# Patient Record
Sex: FEMALE | Race: BLACK OR AFRICAN AMERICAN | NOT HISPANIC OR LATINO | Employment: OTHER | URBAN - METROPOLITAN AREA
[De-identification: names, ages, dates, MRNs, and addresses within clinical notes are randomized per-mention and may not be internally consistent; named-entity substitution may affect disease eponyms.]

---

## 2018-07-02 ENCOUNTER — HOSPITAL ENCOUNTER (EMERGENCY)
Facility: HOSPITAL | Age: 68
Discharge: HOME | End: 2018-07-03
Attending: EMERGENCY MEDICINE
Payer: OTHER GOVERNMENT

## 2018-07-02 DIAGNOSIS — R33.9 URINARY RETENTION: ICD-10-CM

## 2018-07-02 DIAGNOSIS — R41.82 ALTERED MENTAL STATUS: ICD-10-CM

## 2018-07-02 DIAGNOSIS — K59.00 CONSTIPATION, UNSPECIFIED CONSTIPATION TYPE: Primary | ICD-10-CM

## 2018-07-02 LAB
ALBUMIN SERPL BCP-MCNC: 3 G/DL
ALP SERPL-CCNC: 113 U/L
ALT SERPL W/O P-5'-P-CCNC: 41 U/L
ANION GAP SERPL CALC-SCNC: 9 MMOL/L
AST SERPL-CCNC: 73 U/L
BASOPHILS # BLD AUTO: 0.02 K/UL
BASOPHILS NFR BLD: 0.4 %
BILIRUB SERPL-MCNC: 1.3 MG/DL
BILIRUB UR QL STRIP: NEGATIVE
BUN SERPL-MCNC: 17 MG/DL
CALCIUM SERPL-MCNC: 10.2 MG/DL
CHLORIDE SERPL-SCNC: 110 MMOL/L
CLARITY UR: CLEAR
CO2 SERPL-SCNC: 24 MMOL/L
COLOR UR: YELLOW
CREAT SERPL-MCNC: 1.3 MG/DL
DIFFERENTIAL METHOD: ABNORMAL
EOSINOPHIL # BLD AUTO: 0.2 K/UL
EOSINOPHIL NFR BLD: 3.9 %
ERYTHROCYTE [DISTWIDTH] IN BLOOD BY AUTOMATED COUNT: 17.3 %
EST. GFR  (AFRICAN AMERICAN): 49 ML/MIN/1.73 M^2
EST. GFR  (NON AFRICAN AMERICAN): 43 ML/MIN/1.73 M^2
GLUCOSE SERPL-MCNC: 138 MG/DL
GLUCOSE UR QL STRIP: NEGATIVE
HCT VFR BLD AUTO: 32.6 %
HGB BLD-MCNC: 11 G/DL
HGB UR QL STRIP: NEGATIVE
KETONES UR QL STRIP: NEGATIVE
LEUKOCYTE ESTERASE UR QL STRIP: NEGATIVE
LYMPHOCYTES # BLD AUTO: 1.2 K/UL
LYMPHOCYTES NFR BLD: 26.1 %
MAGNESIUM SERPL-MCNC: 2.1 MG/DL
MCH RBC QN AUTO: 30.7 PG
MCHC RBC AUTO-ENTMCNC: 33.7 G/DL
MCV RBC AUTO: 91 FL
MONOCYTES # BLD AUTO: 0.9 K/UL
MONOCYTES NFR BLD: 20 %
NEUTROPHILS # BLD AUTO: 2.3 K/UL
NEUTROPHILS NFR BLD: 49.2 %
NITRITE UR QL STRIP: NEGATIVE
PH UR STRIP: 5 [PH] (ref 5–8)
PLATELET # BLD AUTO: 134 K/UL
PMV BLD AUTO: 10.9 FL
POCT GLUCOSE: 161 MG/DL (ref 70–110)
POTASSIUM SERPL-SCNC: 4.3 MMOL/L
PROT SERPL-MCNC: 8.6 G/DL
PROT UR QL STRIP: NEGATIVE
RBC # BLD AUTO: 3.58 M/UL
SODIUM SERPL-SCNC: 143 MMOL/L
SP GR UR STRIP: 1.01 (ref 1–1.03)
TROPONIN I SERPL DL<=0.01 NG/ML-MCNC: 0.01 NG/ML
URN SPEC COLLECT METH UR: ABNORMAL
UROBILINOGEN UR STRIP-ACNC: ABNORMAL EU/DL
WBC # BLD AUTO: 4.64 K/UL

## 2018-07-02 PROCEDURE — 84484 ASSAY OF TROPONIN QUANT: CPT

## 2018-07-02 PROCEDURE — 87086 URINE CULTURE/COLONY COUNT: CPT

## 2018-07-02 PROCEDURE — 82962 GLUCOSE BLOOD TEST: CPT

## 2018-07-02 PROCEDURE — 99285 EMERGENCY DEPT VISIT HI MDM: CPT | Mod: 25

## 2018-07-02 PROCEDURE — 25000003 PHARM REV CODE 250: Performed by: EMERGENCY MEDICINE

## 2018-07-02 PROCEDURE — 93010 ELECTROCARDIOGRAM REPORT: CPT | Mod: ,,, | Performed by: INTERNAL MEDICINE

## 2018-07-02 PROCEDURE — 85025 COMPLETE CBC W/AUTO DIFF WBC: CPT

## 2018-07-02 PROCEDURE — 81003 URINALYSIS AUTO W/O SCOPE: CPT

## 2018-07-02 PROCEDURE — 80053 COMPREHEN METABOLIC PANEL: CPT

## 2018-07-02 PROCEDURE — 83735 ASSAY OF MAGNESIUM: CPT

## 2018-07-02 PROCEDURE — 93005 ELECTROCARDIOGRAM TRACING: CPT

## 2018-07-02 RX ORDER — CHOLECALCIFEROL (VITAMIN D3) 25 MCG
1000 TABLET ORAL DAILY
COMMUNITY

## 2018-07-02 RX ORDER — AMLODIPINE BESYLATE 5 MG/1
5 TABLET ORAL DAILY
COMMUNITY

## 2018-07-02 RX ORDER — LISINOPRIL 10 MG/1
10 TABLET ORAL DAILY
COMMUNITY

## 2018-07-02 RX ORDER — ARIPIPRAZOLE 10 MG/1
5 TABLET ORAL DAILY
COMMUNITY

## 2018-07-02 RX ORDER — DOCUSATE SODIUM 100 MG/1
100 CAPSULE, LIQUID FILLED ORAL 2 TIMES DAILY
COMMUNITY

## 2018-07-02 RX ORDER — FUROSEMIDE 40 MG/1
40 TABLET ORAL 2 TIMES DAILY
COMMUNITY

## 2018-07-02 RX ORDER — PSEUDOEPHEDRINE/ACETAMINOPHEN 30MG-500MG
100 TABLET ORAL
Status: COMPLETED | OUTPATIENT
Start: 2018-07-02 | End: 2018-07-02

## 2018-07-02 RX ORDER — FERROUS SULFATE 325(65) MG
325 TABLET, DELAYED RELEASE (ENTERIC COATED) ORAL
COMMUNITY

## 2018-07-02 RX ORDER — SYRING-NEEDL,DISP,INSUL,0.3 ML 29 G X1/2"
296 SYRINGE, EMPTY DISPOSABLE MISCELLANEOUS
Status: COMPLETED | OUTPATIENT
Start: 2018-07-02 | End: 2018-07-02

## 2018-07-02 RX ORDER — OMEPRAZOLE 20 MG/1
20 CAPSULE, DELAYED RELEASE ORAL DAILY
COMMUNITY

## 2018-07-02 RX ORDER — PROPRANOLOL HYDROCHLORIDE 20 MG/1
20 TABLET ORAL 3 TIMES DAILY
COMMUNITY

## 2018-07-02 RX ORDER — POTASSIUM CHLORIDE 20 MEQ/1
20 TABLET, EXTENDED RELEASE ORAL 2 TIMES DAILY
COMMUNITY

## 2018-07-02 RX ORDER — POLYVINYL ALCOHOL 14 MG/ML
1 SOLUTION/ DROPS OPHTHALMIC
COMMUNITY

## 2018-07-02 RX ORDER — OXYMETAZOLINE HCL 0.05 %
2 SPRAY, NON-AEROSOL (ML) NASAL 2 TIMES DAILY
COMMUNITY

## 2018-07-02 RX ADMIN — MAGESIUM CITRATE 296 ML: 1.75 LIQUID ORAL at 10:07

## 2018-07-02 RX ADMIN — Medication 100 ML: at 10:07

## 2018-07-02 RX ADMIN — SODIUM CHLORIDE 500 ML: 0.9 INJECTION, SOLUTION INTRAVENOUS at 10:07

## 2018-07-02 NOTE — ED PROVIDER NOTES
Encounter Date: 7/2/2018    SCRIBE #1 NOTE: I, Bo Toney, am scribing for, and in the presence of,  Ramona Whittaker MD. I have scribed the following portions of the note - Other sections scribed: HPI, ROS, PE.       History     Chief Complaint   Patient presents with    Altered Mental Status     Sent from [facility]. Was placed on psych meds and started to have a decline in mental status. Reports decreasing meds and pt still lethargic and  having difficulty feeding herself. [facility] reports pts hands have become contracted since last week and she has a UTI. Upon arrival pt has no complaints but has slurred speech     CC: Abdominal Pain; Knee Pain    HPI: This 68 y.o female with a PMHx DM, HTN, Seizures, schizophrenia and PSHx Knee surgery presents to the ED via EMS [facility] for evaluation of decrease in her mental status.  The patient was recently started on benztropine and Abilify.  And staff noted that she was lethargic and having difficulty feeding herself.  The staff reports that her medications were decrease but they still noticed that patient is having difficulty.  At time history and physical patient is resting comfortably in stretcher.  She complains of abdominal discomfort and knee pain. Denies fever chills, chest pain, change in her vision, numbness or weakness.      The history is provided by the patient. The history is limited by the condition of the patient. No  was used.     Review of patient's allergies indicates:   Allergen Reactions    Keflex [cephalexin]      Past Medical History:   Diagnosis Date    Arthritis     Diabetes mellitus     Hypertension     Seizures     Sinusitis      Past Surgical History:   Procedure Laterality Date    BONY PELVIS SURGERY      BREAST SURGERY      HYSTERECTOMY      KNEE SURGERY       History reviewed. No pertinent family history.  Social History   Substance Use Topics    Smoking status: Never Smoker     Smokeless tobacco: Never Used    Alcohol use No     Review of Systems   Constitutional: Negative for chills and fever.   Eyes: Negative for visual disturbance.   Respiratory: Positive for shortness of breath.    Cardiovascular: Negative for chest pain.   Gastrointestinal: Positive for abdominal pain (Pt points to LLQ).   Genitourinary: Negative for dysuria.   Musculoskeletal: Positive for arthralgias. Negative for back pain.        (+) knee pain     Neurological: Negative for numbness.   Psychiatric/Behavioral: Negative for suicidal ideas.       Physical Exam     Initial Vitals [07/02/18 1704]   BP Pulse Resp Temp SpO2   109/76 68 20 98.6 °F (37 °C) 99 %      MAP       --         Physical Exam    Nursing note and vitals reviewed.  Constitutional: She is not diaphoretic.  Non-toxic appearance. She does not appear ill. No distress.   HENT:   Head: Normocephalic and atraumatic.   Mouth/Throat: Oropharynx is clear and moist.   Eyes: EOM are normal. Pupils are equal, round, and reactive to light. No scleral icterus.   Neck: Normal range of motion. Neck supple. No JVD present.   Cardiovascular: Normal rate and regular rhythm.   Pulmonary/Chest: Effort normal and breath sounds normal. No respiratory distress.   Abdominal: Soft. Normal appearance. She exhibits distension (mild). There is no tenderness. There is no rebound and no guarding.   Musculoskeletal: She exhibits no tenderness.   Bilateral knees without significant effusion, no erythema no warmth to touch.   Neurological: She is alert. She has normal strength. No cranial nerve deficit or sensory deficit.   Drowsy   Skin: Skin is warm and dry.   Psychiatric:   speaks slowly, but  Answers questions appropriately           ED Course   Procedures  Labs Reviewed   CBC W/ AUTO DIFFERENTIAL - Abnormal; Notable for the following:        Result Value    RBC 3.58 (*)     Hemoglobin 11.0 (*)     Hematocrit 32.6 (*)     RDW 17.3 (*)     Platelets 134 (*)     Mono% 20.0 (*)      All other components within normal limits   COMPREHENSIVE METABOLIC PANEL - Abnormal; Notable for the following:     Glucose 138 (*)     Total Protein 8.6 (*)     Albumin 3.0 (*)     Total Bilirubin 1.3 (*)     AST 73 (*)     eGFR if  49 (*)     eGFR if non  43 (*)     All other components within normal limits   URINALYSIS - Abnormal; Notable for the following:     Urobilinogen, UA 2.0-3.0 (*)     All other components within normal limits   POCT GLUCOSE - Abnormal; Notable for the following:     POCT Glucose 161 (*)     All other components within normal limits   CULTURE, URINE   TROPONIN I   MAGNESIUM   POCT GLUCOSE MONITORING CONTINUOUS     EKG Readings: (Independently Interpreted)   Initial Reading: No STEMI. Previous EKG: Compared with most recent EKG Previous EKG Date: 7/2014. Rhythm: Normal Sinus Rhythm. Heart Rate: 67. Ectopy: No Ectopy. Conduction: Normal. ST Segments: Non-Specific ST Segment Depression.   Irregular baseline       Imaging Results          CT Abdomen Pelvis  Without Contrast (Final result)  Result time 07/02/18 20:58:51    Final result by Inocencio Jamison MD (07/02/18 20:58:51)                 Impression:      1. Large amount of stool throughout the colon, findings are concerning for diffuse constipation.  Prominent distal small bowel loops, several of which with internal fecal content, suggests sequela of constipation and/or slow flow.  Early enteritis is a consideration although correlation is needed.  There are numerous mesenteric lymph nodes and mesenteric haziness, nonspecific, could be reactive.  2. Right nonobstructive nephrolithiasis.  3. Nodular hepatic contour, nonspecific, correlation with LFTs, changes of cirrhosis not excluded.  4. Postsurgical changes of the anterior abdominal wall, status post hysterectomy, and several additional findings above.      Electronically signed by: Inocencio Jamison MD  Date:    07/02/2018  Time:    20:58              Narrative:    EXAMINATION:  CT ABDOMEN PELVIS WITHOUT CONTRAST    CLINICAL HISTORY:  Abdominal pain, unspecified;    TECHNIQUE:  Low dose axial images, sagittal and coronal reformations were obtained from the lung bases to the pubic symphysis.  Oral contrast was not administered.    COMPARISON:  07/03/2014    FINDINGS:  Images of the lower thorax are remarkable for dependent atelectasis, patchy ground-glass attenuation suggests superimposed edema, correlation recommended.  Overall evaluation is limited secondary to patient motion.    There is a small hiatal hernia.    Examination of the abdomen and pelvis is limited secondary to extensive patient motion.  Allowing for this, the liver, spleen, pancreas, gallbladder and right adrenal gland are grossly unremarkable noting nodular contour of the hepatic parenchyma..  There is a low attenuating lesion within the left adrenal gland, subcentimeter, may reflect adenoma, stable.  There is no biliary dilation.  The pancreatic duct is not dilated.  Scattered shotty periaortic and paracaval lymph nodes are noted.    There is right nonobstructive nephrolithiasis.  There is no hydronephrosis.  There is no left nephrolithiasis.  The bilateral ureters are unable to be followed in their entirety to the urinary bladder however no definite calculi along their visualized courses.  There is right perinephric fat stranding, nonspecific, correlation with urinalysis recommended.  The urinary bladder is mildly distended without wall thickening.  The uterus is absent the adnexa is grossly unremarkable.    There is a moderate to large amount of stool in the rectum.  There is a moderate to large amount of stool throughout the remainder of the colon, concerning for constipation.  The terminal ileum and appendix are grossly unremarkable.  Numerous mesenteric lymph nodes are noted, not significantly enlarged.  The small bowel is grossly unremarkable.  There is mesenteric haziness, nonspecific,  could reflect mild mesenteric edema.  There is slow flow through a few distal small bowel loops, suggesting sequela of constipation, differential would include early changes of enteritis.  No large volume free air or pneumatosis.    Degenerative changes are noted of the lumbar spine without focal osseous destructive process.  There is grade 1 anterolisthesis of L 4 on L5.  No significant axillary lymphadenopathy.  There is atherosclerotic calcification of the aorta and its branches.                               CT Head Without Contrast (Final result)  Result time 07/02/18 19:37:44    Final result by Wang Daniels MD (07/02/18 19:37:44)                 Impression:      No CT evidence of acute intracranial abnormality.    Left maxillary sinus disease.      Electronically signed by: Wang Daniels MD  Date:    07/02/2018  Time:    19:37             Narrative:    EXAMINATION:  CT HEAD WITHOUT CONTRAST    CLINICAL HISTORY:  Confusion/delirium, altered LOC, unexplained;    TECHNIQUE:  Low dose axial images were obtained through the head.  Coronal and sagittal reformations were also performed. Contrast was not administered.    COMPARISON:  07/03/2014.    FINDINGS:  No evidence of acute territorial infarct, hemorrhage, mass effect, or midline shift.    Ventricles are normal in size and configuration.    No displaced calvarial fracture.    There is complete opacification of the left maxillary sinus.  Otherwise, the visualized paranasal sinuses and mastoid air cells are essentially clear.  There is an old fracture the right medial orbital wall.                               X-Ray Chest 1 View (Final result)  Result time 07/02/18 17:49:17    Final result by Michelle He MD (07/02/18 17:49:17)                 Impression:      No acute abnormality.      Electronically signed by: Michelle He MD  Date:    07/02/2018  Time:    17:49             Narrative:    EXAMINATION:  XR CHEST 1 VIEW    CLINICAL HISTORY:  .  Altered mental status, unspecified    TECHNIQUE:  Single frontal portable view of the chest was performed.    COMPARISON:  None    FINDINGS:  Support devices: None    The lungs are clear, with normal appearance of pulmonary vasculature and no pleural effusion or pneumothorax.    The cardiac silhouette is normal in size. The hilar and mediastinal contours are unremarkable.    Bones are intact.                              X-Rays:   Independently Interpreted Readings:   Chest X-Ray: No infiltrates.  No acute abnormalities.     Medical Decision Making:   History:   Old Medical Records: I decided to obtain old medical records.  Differential Diagnosis:   Medication side effect  Electrolyte abnormality  Occult infection  CVA  Independently Interpreted Test(s):   I have ordered and independently interpreted X-rays - see prior notes.  I have ordered and independently interpreted EKG Reading(s) - see prior notes  Clinical Tests:   Lab Tests: Ordered and Reviewed  Radiological Study: Ordered and Reviewed  Medical Tests: Ordered and Reviewed  ED Management:  Patient afebrile in no acute distress at time history and physical.  She has no obvious focal neurological deficits.  She speaks slowly but answers questions appropriately.  She does hold her left left wrist flexed and left hand close but is able to open and close the fist normally and flex and extend the wrist without difficulty.  She has 5/5  strength.  The CT scan brain is without acute intracranial abnormality.  EKG without acute ischemic changes.  Labs without leukocytosis or granulocytosis suggest significant infection.  Chemistry notable for mild decrease in her GFR.  Troponin is negative. UA is not indicative of UTI.  When patient was straight cathed she produced 1500 cc of urine.  CT scan the abdomen pelvis is notable for extensive constipation.  Etiology of patient's urine retention may be secondary to constipation or secondary to the benztropine that she  takes.  Patient given enema in the emergency room with bowel movement.  She has remained hemodynamically stable and fit for discharge back to psychiatric facility.  She should be monitored for any evidence of continued urinary retention and have a repeat chemistry to monitor her renal function within a week and follow up with a primary care physician.            Scribe Attestation:   Scribe #1: I performed the above scribed service and the documentation accurately describes the services I performed. I attest to the accuracy of the note.    Attending Attestation:           Physician Attestation for Scribe:  Physician Attestation Statement for Scribe #1: I, Ramona Whittaker MD, reviewed documentation, as scribed by Bo Toney in my presence, and it is both accurate and complete.                    Clinical Impression:   The primary encounter diagnosis was Constipation, unspecified constipation type. Diagnoses of Altered mental status and Urinary retention were also pertinent to this visit.      Disposition:   Disposition: Discharged  Condition: Stable                        Ramona Whittaker MD  07/02/18 0528

## 2018-07-02 NOTE — ED NOTES
Obtained cath specimen per MD order drained 1500ml of clear, dark yellow urine. Specimen sent to lab provider notified

## 2018-07-02 NOTE — ED NOTES
"Report from EMS they were called in for alt mental status. Upon arrival told by staff the pt prev walking and talking and not pt unable to feed self [facility] staff reports pt [facility] contracted EMS states pt was eating sandwich and drinking milk unassisted When asked to open hands pt is able to fan both hands without resistance per EMS  pt informed EMS that she fell in room. Upon my assessment of pt when asked what she came to ED for pt states " them people sent me here" pt referring to [facility]  "

## 2018-07-02 NOTE — ED TRIAGE NOTES
Pt arrived via EMS staff reports pt has altered mental status and unable to perform ADL's starting today

## 2018-07-03 VITALS
OXYGEN SATURATION: 97 % | DIASTOLIC BLOOD PRESSURE: 75 MMHG | SYSTOLIC BLOOD PRESSURE: 131 MMHG | HEART RATE: 75 BPM | HEIGHT: 66 IN | TEMPERATURE: 98 F | BODY MASS INDEX: 32.14 KG/M2 | RESPIRATION RATE: 18 BRPM | WEIGHT: 200 LBS

## 2018-07-03 NOTE — DISCHARGE INSTRUCTIONS
CT scan the brain did not demonstrate any intercranial abnormalities.  Patient is able to ambulate normally in the emergency room and she is using her arms and legs normally.  Lab work was significant for a mild decrease in her renal function. CT scan abdomen pelvis was significant for extensive constipation but no bowel obstruction.  Patient was retaining urine in the emergency room.  She was given an enema to relieve her constipation.  If her urinary retention is the result of her constipation she should begin to urinate normally.  It is possible that her urinary tension may be due to her medications.  She should have her psychiatric medications adjusted as indicated.  She should also be monitored for repeat episodes of urinary retention and have a repeat chemistry within a week to monitor renal function and make sure it is improving.  She should return to the emergency room is for inability to urinate or any new or worsening symptoms.

## 2018-07-03 NOTE — ED NOTES
Spoke w/ Ham at [facility] dispatch. Crew is coming from [place] and will be here in 30 - 45 mins. Charge nurse made aware.

## 2018-07-03 NOTE — ED NOTES
PIV d/c'd per protocol with cath tip intact. Bandage placed at site. Pt AAO with RR easy even and full. NAD noted at this time. Awaiting SPD to  for transport.

## 2018-07-03 NOTE — ED NOTES
Spoke with RN at [facility] and updated on pt status. Will return to [facility] after results from enema.

## 2018-07-03 NOTE — ED NOTES
SPD notified of pt transport to return to [facility] in [facility]. Patsy Estrada, charge nurse of [facility] notified of pt return to [facility]. Charge RN awaiting pt return.

## 2018-07-04 LAB — BACTERIA UR CULT: NORMAL

## 2019-02-17 ENCOUNTER — HOSPITAL ENCOUNTER (EMERGENCY)
Dept: HOSPITAL 62 - ER | Age: 69
Discharge: HOME | End: 2019-02-17
Payer: OTHER GOVERNMENT

## 2019-02-17 VITALS — DIASTOLIC BLOOD PRESSURE: 65 MMHG | SYSTOLIC BLOOD PRESSURE: 136 MMHG

## 2019-02-17 DIAGNOSIS — Z76.0: Primary | ICD-10-CM

## 2019-02-17 DIAGNOSIS — Z79.899: ICD-10-CM

## 2019-02-17 DIAGNOSIS — J81.0: ICD-10-CM

## 2019-02-17 LAB
ADD MANUAL DIFF: NO
ALBUMIN SERPL-MCNC: 3.7 G/DL (ref 3.5–5)
ALP SERPL-CCNC: 139 U/L (ref 38–126)
ALT SERPL-CCNC: 26 U/L (ref 9–52)
ANION GAP SERPL CALC-SCNC: 9 MMOL/L (ref 5–19)
APTT BLD: 34.9 SEC (ref 23.5–35.8)
AST SERPL-CCNC: 61 U/L (ref 14–36)
BASOPHILS # BLD AUTO: 0 10^3/UL (ref 0–0.2)
BASOPHILS NFR BLD AUTO: 0.7 % (ref 0–2)
BILIRUB DIRECT SERPL-MCNC: 0.3 MG/DL (ref 0–0.4)
BILIRUB SERPL-MCNC: 0.7 MG/DL (ref 0.2–1.3)
BUN SERPL-MCNC: 15 MG/DL (ref 7–20)
CALCIUM: 9.7 MG/DL (ref 8.4–10.2)
CHLORIDE SERPL-SCNC: 105 MMOL/L (ref 98–107)
CK MB SERPL-MCNC: 0.52 NG/ML (ref ?–4.55)
CK SERPL-CCNC: 115 U/L (ref 30–135)
CO2 SERPL-SCNC: 27 MMOL/L (ref 22–30)
EOSINOPHIL # BLD AUTO: 0.1 10^3/UL (ref 0–0.6)
EOSINOPHIL NFR BLD AUTO: 4.3 % (ref 0–6)
ERYTHROCYTE [DISTWIDTH] IN BLOOD BY AUTOMATED COUNT: 16 % (ref 11.5–14)
GLUCOSE SERPL-MCNC: 195 MG/DL (ref 75–110)
HCT VFR BLD CALC: 28.6 % (ref 36–47)
HGB BLD-MCNC: 9.8 G/DL (ref 12–15.5)
INR PPP: 1.06
LYMPHOCYTES # BLD AUTO: 0.6 10^3/UL (ref 0.5–4.7)
LYMPHOCYTES NFR BLD AUTO: 22.6 % (ref 13–45)
MCH RBC QN AUTO: 32.5 PG (ref 27–33.4)
MCHC RBC AUTO-ENTMCNC: 34.1 G/DL (ref 32–36)
MCV RBC AUTO: 95 FL (ref 80–97)
MONOCYTES # BLD AUTO: 0.3 10^3/UL (ref 0.1–1.4)
MONOCYTES NFR BLD AUTO: 11.9 % (ref 3–13)
NEUTROPHILS # BLD AUTO: 1.6 10^3/UL (ref 1.7–8.2)
NEUTS SEG NFR BLD AUTO: 60.5 % (ref 42–78)
NT PRO BNP: 336 PG/ML (ref 5–900)
PLATELET # BLD: 105 10^3/UL (ref 150–450)
POTASSIUM SERPL-SCNC: 4.2 MMOL/L (ref 3.6–5)
PROT SERPL-MCNC: 8 G/DL (ref 6.3–8.2)
PROTHROMBIN TIME: 14.3 SEC (ref 11.4–15.4)
RBC # BLD AUTO: 3 10^6/UL (ref 3.72–5.28)
SODIUM SERPL-SCNC: 140.9 MMOL/L (ref 137–145)
TOTAL CELLS COUNTED % (AUTO): 100 %
TROPONIN I SERPL-MCNC: < 0.012 NG/ML
WBC # BLD AUTO: 2.6 10^3/UL (ref 4–10.5)

## 2019-02-17 PROCEDURE — 85610 PROTHROMBIN TIME: CPT

## 2019-02-17 PROCEDURE — 99284 EMERGENCY DEPT VISIT MOD MDM: CPT

## 2019-02-17 PROCEDURE — 93010 ELECTROCARDIOGRAM REPORT: CPT

## 2019-02-17 PROCEDURE — 82553 CREATINE MB FRACTION: CPT

## 2019-02-17 PROCEDURE — 83880 ASSAY OF NATRIURETIC PEPTIDE: CPT

## 2019-02-17 PROCEDURE — 85730 THROMBOPLASTIN TIME PARTIAL: CPT

## 2019-02-17 PROCEDURE — 93005 ELECTROCARDIOGRAM TRACING: CPT

## 2019-02-17 PROCEDURE — 85025 COMPLETE CBC W/AUTO DIFF WBC: CPT

## 2019-02-17 PROCEDURE — 36415 COLL VENOUS BLD VENIPUNCTURE: CPT

## 2019-02-17 PROCEDURE — 84484 ASSAY OF TROPONIN QUANT: CPT

## 2019-02-17 PROCEDURE — 80053 COMPREHEN METABOLIC PANEL: CPT

## 2019-02-17 PROCEDURE — 82140 ASSAY OF AMMONIA: CPT

## 2019-02-17 PROCEDURE — 82550 ASSAY OF CK (CPK): CPT

## 2019-02-17 PROCEDURE — 71045 X-RAY EXAM CHEST 1 VIEW: CPT

## 2019-02-17 NOTE — ER DOCUMENT REPORT
Entered by PARMINDER ASCENCIO SCRIBE  02/17/19 2003 





Acting as scribe for:JACINDA TAY DO





ED Medical Screen (RME)





- General


Chief Complaint: Leg Swelling


Stated Complaint: LEG SWELLING


Time Seen by Provider: 02/17/19 19:07


Mode of Arrival: Wheelchair


Information source: Patient


Notes: 


Patient is a 68 year old female presenting to the emergency department 

complaining of lower extremity swelling onset approximately 1 week ago. Patient 

states she has a history of Hepatitis C and subsequent liver failure and fluid 

overload. Patient also complains of a cough and intermittent chest pain. 





States she has had ascites removed from her abdomen in the past.  Denies any his

tory of congestive heart failure.





I have greeted and performed a rapid initial assessment of this patient. A 

comprehensive ED assessment and evaluation of the patient, analysis of test 

results and completion of the medical decision making process will be conducted 

by additional ED providers.





GENERAL: Alert, interacts well. No acute distress.


HEAD: Normocephalic, Atraumatic. 


EYES: Pupils equal, round, and reactive to light. EOMI.


ENT: Oral mucosa moist, tongue midline.


NECK: Full range of motion. Supple. Trachea midline. 


LUNGS: Clear to auscultation bilaterally, no wheezes, rales, or rhonchi. No 

respiratory distress.


HEART: Regular rate and rhythm. 3 out of 6 systolic murmur.  No gallops or rubs.


ABDOMEN: Soft, non-tender.Stretch marks on abdomen, no caput medusa. Non-

distended. Bowel sounds present in all 4 quadrants.


EXTREMITIES: Moves all four extremities spontaneously.  2+ pitting edema to the 

levels of the knee.


PSYCH: Normal affect, normal mood. 





TRAVEL OUTSIDE OF THE [place] IN LAST 30 DAYS: No





- Related Data


Allergies/Adverse Reactions: 


                                        





cephalexin [From Keflex] Allergy (Verified 02/17/19 17:58)


   


furosemide [From Lasix] Allergy (Verified 02/17/19 17:58)


   











Past Medical History





- Social History


Chew tobacco use (# tins/day): No


Frequency of alcohol use: None


Drug Abuse: None


Neurological Medical History: Reports: Hx Seizures


Renal/ Medical History: Denies: Hx Peritoneal Dialysis


Past Surgical History: Reports: Hx Breast Surgery - reduction, Hx Hysterectomy





Physical Exam





- Vital signs


Vitals: 


                                        











Temp Pulse Resp BP Pulse Ox


 


 99.5 F   93   16   144/65 H  98 


 


 02/17/19 18:01  02/17/19 18:01  02/17/19 18:01  02/17/19 18:01  02/17/19 18:01














Course





- Vital Signs


Vital signs: 


                                        











Temp Pulse Resp BP Pulse Ox


 


 99.5 F   93   16   144/65 H  98 


 


 02/17/19 18:01  02/17/19 18:01  02/17/19 18:01  02/17/19 18:01  02/17/19 18:01














- Laboratory


Result Diagrams: 


                                 02/17/19 19:47





                                 02/17/19 19:47





I personally performed the services described in the documentation, reviewed and

edited the documentation which was dictated to the scribe in my presence, and it

accurately records my words and actions.

## 2019-02-17 NOTE — ER DOCUMENT REPORT
ED General





- General


Chief Complaint: Leg Swelling


Stated Complaint: LEG SWELLING


Time Seen by Provider: 02/17/19 19:07


Mode of Arrival: Wheelchair


Notes: 





Patient is a 68-year-old female with a past medical history of bilateral lower 

extremity edema, hepatitis C, report of possible schizophrenia who presents with

his sister for multiple concerns.  The history is provided by the patient and 

her sister is quite different from the triage assessment.  They report that the 

primary reason they have come to the emergency department tonight is because the

patient has run out of her risperidone 1 mg twice daily.  The patient was 

recently removed from a nursing facility outside of [facility] as the

patient was being treated and kept at that facility apparently due to mental 

incompetence secondary to schizophrenia, a diagnosis that both the patient and 

sister deny.  Apparently the patient requested that her sister come get her 

released from the nursing facility and this just occurred several days ago.  The

sister brought the patient back here to [place] and the patient has not 

yet been able to established prenatal care.  Sister states that "when they 

discharged her from the nursing facility they told me to keep her on her 

medications and she would be okay so that is why brought her in tonight because 

I did not want her to run out of these medications".  Her sister also relates a 

concern that the patient has had some swelling in her legs although notes that 

it is not new or different relative to when she first picked her up from the 

nursing facility.  Patient does take furosemide 20 mg twice daily.  No missed 

doses.  The patient does not have any history of CHF or chronic kidney disease. 

Has a history of hepatitis C but denies any history of liver cirrhosis or 

failure.  Patient denies any chest pain to me although it was noted in triage 

that she had complained of chest pain.  When I confront the patient about this 

she denies the claim again stating she does not have chest pain.  She also 

denies any significant shortness of breath although states that sometimes when 

she exerts herself she "becomes winded".  Denies any current symptoms at the 

time of my assessment.


TRAVEL OUTSIDE OF THE [place] IN LAST 30 DAYS: No





- Related Data


Allergies/Adverse Reactions: 


                                        





cephalexin [From Keflex] Allergy (Verified 02/17/19 17:58)


   











Past Medical History





- General


Information source: Patient





- Social History


Smoking Status: Never Smoker


Chew tobacco use (# tins/day): No


Frequency of alcohol use: None


Drug Abuse: None


Lives with: Spouse/Significant other


Family History: Reviewed & Not Pertinent


Patient has suicidal ideation: No


Patient has homicidal ideation: No


Neurological Medical History: Reports: Hx Seizures


Renal/ Medical History: Denies: Hx Peritoneal Dialysis


Past Surgical History: Reports: Hx Breast Surgery - reduction, Hx Hysterectomy





Review of Systems





- Review of Systems


Notes: 





Constitutional: Negative for fever.


HENT: Negative for sore throat.


Eyes: Negative for visual changes.


Cardiovascular: Negative for chest pain.


Respiratory: Positive for shortness of breath.


Gastrointestinal: Negative for abdominal pain, vomiting or diarrhea.


Genitourinary: Negative for dysuria.


Musculoskeletal: Positive for bilateral lower extremity edema


Skin: Negative for rash.


Neurological: Negative for headaches, weakness or numbness.





10 point ROS negative except as marked above and in HPI.





Physical Exam





- Vital signs


Vitals: 


                                        











Temp Pulse Resp BP Pulse Ox


 


 99.5 F   93   16   144/65 H  98 


 


 02/17/19 18:01  02/17/19 18:01  02/17/19 18:01  02/17/19 18:01  02/17/19 18:01











Interpretation: Hypertensive


Notes: 





PHYSICAL EXAMINATION:





GENERAL: Well-appearing, well-nourished and in no acute distress.





HEAD: Atraumatic, normocephalic.





EYES: Pupils equal round and reactive to light, extraocular movements intact, 

sclera anicteric, conjunctiva are normal.





ENT: nares patent, oropharynx clear without exudates.  Moist mucous membranes.





NECK: Normal range of motion, supple without lymphadenopathy





LUNGS: Breath sounds clear to auscultation bilaterally and equal.  No wheezes 

rales or rhonchi.





HEART: Regular rate and rhythm without murmurs





ABDOMEN: Soft, nontender, normoactive bowel sounds.  No guarding, no rebound.  

No masses appreciated.





EXTREMITIES: Normal range of motion, 1+ pitting edema in the bilateral lower 

extremities that is equal and symmetric.  No cyanosis.





NEUROLOGICAL: No focal neurological deficits. Moves all extremities 

spontaneously and on command.





PSYCH: Normal mood, normal affect.





SKIN: Warm, Dry, normal turgor, no rashes or lesions noted.





Course





- Re-evaluation


Re-evalutation: 





02/18/19 03:38


Patient presents with a primary concern of needing medication refills.  The 

patient and her sister at the bedside are clear to state that this is the main 

reason they came to the ER is because they do not yet have a primary care 

physician and are concerned that she has run out of both the lorazepam and 

risperidone.  They report that the shortness of breath and lower extremity edema

complaints are chronic and they thought they had mentioned them while they are 

here.  Of note this is quite different from the triage assessment.  I did spend 

an extensive period of time with the patient and her family going over 

everything that was discussed both in triage and refocusing on the initial visit

purpose.  I have discontinued the patient's lorazepam as she is above 67 years 

of age and I do not believe should be to be taken daily, scheduled 

benzodiazepines.  I have continued her risperidone although given the family's 

report that she was allegedly diagnosed with schizophrenia within the past 

several years and had a skeptical of this diagnosis vertically that this would 

be an atypical age for this to be diagnosed.  Patient had no history of 

schizophrenia as a younger individual.  In regards the patient's lower extremity

edema and trace pulmonary edema on chest x-ray, she is saturating 100% on room 

air.  No tachycardia, no tachypnea.  BNP is normal.  I have placed the patient 

in compression stockings and have doubled her Lasix to 40 mill grams twice daily

for the next 1 week.  No indication for hospitalization at this point.  Patient 

adamantly denies chest pain to me.  At this time will discharge with return 

precautions and follow-up recommendations.  Verbal discharge instructions given 

a the bedside and opportunity for questions given. Medication warnings reviewed.

Patient is in agreement with this plan and has verbalized understanding of 

return precautions and the need for primary care follow-up in the next 24-72 

hours.





- Vital Signs


Vital signs: 


                                        











Temp Pulse Resp BP Pulse Ox


 


 99.5 F   93   16   136/65 H  97 


 


 02/17/19 18:01  02/17/19 18:01  02/17/19 23:01  02/17/19 23:01  02/17/19 23:01














- Laboratory


Result Diagrams: 


                                 02/17/19 19:47





                                 02/17/19 19:47


Laboratory results interpreted by me: 


                                        











  02/17/19 02/17/19 02/17/19





  19:47 19:47 19:47


 


WBC   2.6 L 


 


RBC   3.00 L 


 


Hgb   9.8 L 


 


Hct   28.6 L 


 


RDW   16.0 H 


 


Plt Count   105 L 


 


Absolute Neutrophils   1.6 L 


 


Est GFR (Non-Af Amer)    54 L


 


Glucose    195 H


 


AST    61 H


 


Alkaline Phosphatase    139 H


 


Ammonia  45.6 H  














- Diagnostic Test


Radiology reviewed: Image reviewed, Reports reviewed


Radiology results interpreted by me: 





02/18/19 03:40


Chest x-ray: Trace pulmonary edema bilaterally.





- EKG Interpretation by Me


Additional EKG results interpreted by me: 





02/18/19 03:40


Sinus rhythm, rate 81.  No ST elevations or depressions.  QTC is 474.





Discharge





- Discharge


Clinical Impression: 


 Bilateral lower extremity edema, Medication refill





Pulmonary edema


Qualifiers:


 Chronicity: acute Qualified Code(s): J81.0 - Acute pulmonary edema





Condition: Good


Disposition: HOME, SELF-CARE


Additional Instructions: 


Please continue your medications as prescribed with the exception of Lasix which

I would like you to increase to 40 mg twice daily for the next 1 week.  Please 

then return to Lasix 20 mg twice daily as previously prescribed.  You need to 

establish care with a primary care physician as we discussed.  Please return to 

the emergency permit immediately if you develop worsening of the swelling in 

your legs, worsening shortness of breath, chest discomfort, pass out, or have 

any other symptoms that are worrisome to you.


Prescriptions: 


Furosemide [Lasix 20 mg Tablet] 40 mg PO BID #28 tablet


Risperidone [Risperdal] 1 mg PO BID #60 tablet

## 2019-02-17 NOTE — RADIOLOGY REPORT (SQ)
XR CHEST 1 VIEW



HISTORY: leg edema, mild SOB.



COMPARISON: None.



FINDINGS:



Query cardiomegaly with mild pulmonary vascular congestion. No

large pleural effusions or discernible pneumothorax. The osseous

structures are intact.



IMPRESSION:



Pulmonary edema without pleural effusions.

## 2019-02-24 ENCOUNTER — HOSPITAL ENCOUNTER (EMERGENCY)
Dept: HOSPITAL 62 - ER | Age: 69
Discharge: HOME | End: 2019-02-24
Payer: OTHER GOVERNMENT

## 2019-02-24 VITALS — DIASTOLIC BLOOD PRESSURE: 64 MMHG | SYSTOLIC BLOOD PRESSURE: 120 MMHG

## 2019-02-24 DIAGNOSIS — Z87.891: ICD-10-CM

## 2019-02-24 DIAGNOSIS — R60.0: ICD-10-CM

## 2019-02-24 DIAGNOSIS — Z88.0: ICD-10-CM

## 2019-02-24 DIAGNOSIS — D64.9: ICD-10-CM

## 2019-02-24 DIAGNOSIS — J32.0: ICD-10-CM

## 2019-02-24 DIAGNOSIS — R47.81: Primary | ICD-10-CM

## 2019-02-24 DIAGNOSIS — R09.81: ICD-10-CM

## 2019-02-24 DIAGNOSIS — Z79.899: ICD-10-CM

## 2019-02-24 DIAGNOSIS — M54.5: ICD-10-CM

## 2019-02-24 DIAGNOSIS — Z88.1: ICD-10-CM

## 2019-02-24 LAB
ADD MANUAL DIFF: NO
ALBUMIN SERPL-MCNC: 3.6 G/DL (ref 3.5–5)
ALP SERPL-CCNC: 126 U/L (ref 38–126)
ALT SERPL-CCNC: 24 U/L (ref 9–52)
ANION GAP SERPL CALC-SCNC: 6 MMOL/L (ref 5–19)
APPEARANCE UR: CLEAR
APTT PPP: YELLOW S
AST SERPL-CCNC: 61 U/L (ref 14–36)
BASOPHILS # BLD AUTO: 0 10^3/UL (ref 0–0.2)
BASOPHILS NFR BLD AUTO: 0.7 % (ref 0–2)
BILIRUB DIRECT SERPL-MCNC: 0.2 MG/DL (ref 0–0.4)
BILIRUB SERPL-MCNC: 0.8 MG/DL (ref 0.2–1.3)
BILIRUB UR QL STRIP: NEGATIVE
BUN SERPL-MCNC: 17 MG/DL (ref 7–20)
CALCIUM: 9.6 MG/DL (ref 8.4–10.2)
CHLORIDE SERPL-SCNC: 107 MMOL/L (ref 98–107)
CO2 SERPL-SCNC: 27 MMOL/L (ref 22–30)
EOSINOPHIL # BLD AUTO: 0.1 10^3/UL (ref 0–0.6)
EOSINOPHIL NFR BLD AUTO: 4.7 % (ref 0–6)
ERYTHROCYTE [DISTWIDTH] IN BLOOD BY AUTOMATED COUNT: 16.3 % (ref 11.5–14)
GLUCOSE SERPL-MCNC: 199 MG/DL (ref 75–110)
GLUCOSE UR STRIP-MCNC: NEGATIVE MG/DL
HCT VFR BLD CALC: 27.8 % (ref 36–47)
HGB BLD-MCNC: 9.4 G/DL (ref 12–15.5)
KETONES UR STRIP-MCNC: NEGATIVE MG/DL
LYMPHOCYTES # BLD AUTO: 0.6 10^3/UL (ref 0.5–4.7)
LYMPHOCYTES NFR BLD AUTO: 19.3 % (ref 13–45)
MCH RBC QN AUTO: 32.9 PG (ref 27–33.4)
MCHC RBC AUTO-ENTMCNC: 34 G/DL (ref 32–36)
MCV RBC AUTO: 97 FL (ref 80–97)
MONOCYTES # BLD AUTO: 0.4 10^3/UL (ref 0.1–1.4)
MONOCYTES NFR BLD AUTO: 12.4 % (ref 3–13)
NEUTROPHILS # BLD AUTO: 2 10^3/UL (ref 1.7–8.2)
NEUTS SEG NFR BLD AUTO: 62.9 % (ref 42–78)
NITRITE UR QL STRIP: NEGATIVE
NT PRO BNP: 402 PG/ML (ref 5–900)
PH UR STRIP: 6 [PH] (ref 5–9)
PLATELET # BLD: 103 10^3/UL (ref 150–450)
POTASSIUM SERPL-SCNC: 4.2 MMOL/L (ref 3.6–5)
PROT SERPL-MCNC: 8.1 G/DL (ref 6.3–8.2)
PROT UR STRIP-MCNC: NEGATIVE MG/DL
RBC # BLD AUTO: 2.87 10^6/UL (ref 3.72–5.28)
SODIUM SERPL-SCNC: 139.5 MMOL/L (ref 137–145)
SP GR UR STRIP: 1.01
TOTAL CELLS COUNTED % (AUTO): 100 %
TROPONIN I SERPL-MCNC: < 0.012 NG/ML
UROBILINOGEN UR-MCNC: NEGATIVE MG/DL (ref ?–2)
WBC # BLD AUTO: 3.1 10^3/UL (ref 4–10.5)

## 2019-02-24 PROCEDURE — 99285 EMERGENCY DEPT VISIT HI MDM: CPT

## 2019-02-24 PROCEDURE — 85025 COMPLETE CBC W/AUTO DIFF WBC: CPT

## 2019-02-24 PROCEDURE — 84484 ASSAY OF TROPONIN QUANT: CPT

## 2019-02-24 PROCEDURE — 36415 COLL VENOUS BLD VENIPUNCTURE: CPT

## 2019-02-24 PROCEDURE — 70450 CT HEAD/BRAIN W/O DYE: CPT

## 2019-02-24 PROCEDURE — 80053 COMPREHEN METABOLIC PANEL: CPT

## 2019-02-24 PROCEDURE — 93010 ELECTROCARDIOGRAM REPORT: CPT

## 2019-02-24 PROCEDURE — 83880 ASSAY OF NATRIURETIC PEPTIDE: CPT

## 2019-02-24 PROCEDURE — 81001 URINALYSIS AUTO W/SCOPE: CPT

## 2019-02-24 PROCEDURE — 71045 X-RAY EXAM CHEST 1 VIEW: CPT

## 2019-02-24 PROCEDURE — 93005 ELECTROCARDIOGRAM TRACING: CPT

## 2019-02-24 PROCEDURE — 70551 MRI BRAIN STEM W/O DYE: CPT

## 2019-02-24 NOTE — ER DOCUMENT REPORT
ED Medical Screen (RME)





- General


Chief Complaint: Swelling of Lower Extremity


Stated Complaint: SLURRED SPEECH


Time Seen by Provider: 02/24/19 16:37


Notes: 





Patient is a 68-year-old female that presents to the emergency department for 

chief complaint of slurred speech, facial droop and leg swelling.  Patient 

reports that the slurred speech and facial droop occurred about a week ago, and 

has been somewhat progressively worse since that time.





ROS:


Other than noted above, the 12 point review of systems was reviewed with the 

patient and were negative, all pertinent findings are included in the HPI.





PHYSICAL EXAMINATION:





Vital signs reviewed. 





GENERAL: Elderly female, in no apparent distress at this time





HEAD: Atraumatic, normocephalic.





EYES: Pupils equal round extraocular movements intact,  conjunctiva are normal.





ENT: Nares patent





NECK: Normal range of motion





CV: Heart regular rate and rhythm





LUNGS: No respiratory distress





Musculoskeletal: Good ROM





NEUROLOGICAL: Slurred speech, slight left-sided facial droop, and decreased 

sensation on the left side of the face compared to the right.





PSYCH: Normal mood, normal affect.





MDM:


Patient seen and examined for rapid initial assessment. Vital signs reviewed.  A

comprehensive ED assessment and evaluation of the patient, analysis of test 

results and completion of the medical decision making process will be conducted 

by additional ED providers.





*Note is created using voice recognition software and may contain spelling, 

syntax or grammatical errors.  











TRAVEL OUTSIDE OF THE [place] IN LAST 30 DAYS: No





- Related Data


Allergies/Adverse Reactions: 


                                        





cephalexin [From Keflex] Allergy (Verified 02/17/19 17:58)


   











Past Medical History





- Social History


Frequency of alcohol use: None


Drug Abuse: None





- Past Medical History


Cardiac Medical History: Reports: Hx Hypercholesterolemia


Neurological Medical History: Reports: Hx Seizures


Renal/ Medical History: Denies: Hx Peritoneal Dialysis


Past Surgical History: Reports: Hx Breast Surgery - reduction, Hx Hysterectomy, 

Hx Orthopedic Surgery - ankle





Physical Exam





- Vital signs


Vitals: 


                                        











Temp Pulse Resp BP Pulse Ox


 


 98.7 F   88   20   161/73 H  96 


 


 02/24/19 15:37  02/24/19 15:37  02/24/19 15:37  02/24/19 15:37  02/24/19 15:37














Course





- Vital Signs


Vital signs: 


                                        











Temp Pulse Resp BP Pulse Ox


 


 98.7 F   88   20   161/73 H  96 


 


 02/24/19 15:37  02/24/19 15:37  02/24/19 15:37  02/24/19 15:37  02/24/19 15:37

## 2019-02-24 NOTE — RADIOLOGY REPORT (SQ)
EXAM DESCRIPTION:  CT HEAD WITHOUT



COMPLETED DATE/TIME:  2/24/2019 5:56 pm



REASON FOR STUDY:  slurred speech, facial droop on left



COMPARISON:  None.



TECHNIQUE:  Axial images acquired through the brain without intravenous contrast.  Images reviewed wi
th bone, brain and subdural windows.  Additional sagittal and coronal reconstructions were generated.
 Images stored on PACS.

All CT scanners at this facility use dose modulation, iterative reconstruction, and/or weight based d
osing when appropriate to reduce radiation dose to as low as reasonably achievable (ALARA).

[facility]: Dose Right  [facility]: CareDose    [facility]: Dose Right    [facility]: Teradose 4D    [facility]: Smart Soundsupply



RADIATION DOSE:  CT Rad equipment meets quality standard of care and radiation dose reduction techniq
ues were employed. CTDIvol: 53.2 mGy. DLP: 991 mGy-cm. mGy.



LIMITATIONS:  None.



FINDINGS:  VENTRICLES: Normal size and contour.

CEREBRUM: No masses.  No hemorrhage.  No midline shift.  No evidence for acute infarction. Normal gra
y/white matter differentiation. No areas of low density in the white matter.  Physiologic calcificati
ons of the basal ganglia.

CEREBELLUM: No masses.  No hemorrhage.  No alteration of density.  No evidence for acute infarction.

EXTRAAXIAL SPACES: No fluid collections.  No masses.

ORBITS AND GLOBE: No intra- or extraconal masses.  Normal contour of globe without masses.

CALVARIUM: No fracture.

PARANASAL SINUSES: Mucoperiosteal thickening of the left maxillary sinus.

SOFT TISSUES: No mass or hematoma.

OTHER: No other significant finding.



IMPRESSION:

1. No acute intracranial findings.

2. Paranasal sinus disease.

EVIDENCE OF ACUTE STROKE: NO.



COMMENT:  Quality ID # 436: Final reports with documentation of one or more dose reduction techniques
 (e.g., Automated exposure control, adjustment of the mA and/or kV according to patient size, use of 
iterative reconstruction technique)



TECHNICAL DOCUMENTATION:  JOB ID:  [id number]

 2011 [employer]- All Rights Reserved



Reading location - IP/workstation name: [email or web address]

## 2019-02-24 NOTE — ER DOCUMENT REPORT
ED General





- General


Chief Complaint: Swelling of Lower Extremity


Stated Complaint: SLURRED SPEECH


Time Seen by Provider: 02/24/19 16:37


Notes: 


Patient is a 68-year-old female that comes to the emergency department for chief

complaint of slurred speech and left-sided facial droop.  Sister is here with 

patient, they state they started noticing this approximately 1 week ago, she 

states that patient is also had worsened slurring of speech that has been 

persistent since they started noticing.  No focal weakness, no visual changes, 

no imbalance, no other complaints in regards to this.  Patient has general 

complaints otherwise reporting chronic lower extremity swelling and intermittent

lower back pain.  Denies head injury, fever, chest pain, shortness of breath.  

Past medical history of hepatitis C, hyperlipidemia, possible schizophrenia that

she takes risperidone for but she has been on the risperidone and for a long 

time per patient and sister.  Strangely this appears to have been diagnosed late

in life.


TRAVEL OUTSIDE OF THE [place] IN LAST 30 DAYS: No





- Related Data


Allergies/Adverse Reactions: 


                                        





cephalexin [From Keflex] Allergy (Verified 02/17/19 17:58)


   


cillins Allergy (Severe, Uncoded 02/24/19 19:52)


   











Past Medical History





- General


Information source: Patient, Relative - Sister





- Social History


Smoking Status: Former Smoker


Frequency of alcohol use: None


Drug Abuse: None


Lives with: Family


Family History: Reviewed & Not Pertinent


Patient has suicidal ideation: No


Patient has homicidal ideation: No





- Past Medical History


Cardiac Medical History: Reports: Hx Hypercholesterolemia


Neurological Medical History: Reports: Hx Seizures


Renal/ Medical History: Denies: Hx Peritoneal Dialysis


Past Surgical History: Reports: Hx Breast Surgery - reduction, Hx Hysterectomy, 

Hx Orthopedic Surgery - ankle





Review of Systems





- Review of Systems


Constitutional: No symptoms reported


EENT: No symptoms reported


Cardiovascular: No symptoms reported


Respiratory: No symptoms reported


Gastrointestinal: No symptoms reported


Genitourinary: No symptoms reported


Female Genitourinary: No symptoms reported


Musculoskeletal: No symptoms reported


Skin: No symptoms reported


Hematologic/Lymphatic: No symptoms reported


Neurological/Psychological: See HPI





Physical Exam





- Vital signs


Vitals: 


                                        











Temp Pulse Resp BP Pulse Ox


 


 98.7 F   88   20   161/73 H  96 


 


 02/24/19 15:37  02/24/19 15:37  02/24/19 15:37  02/24/19 15:37  02/24/19 15:37














- Notes


Notes: 





GENERAL: Alert, interacts well. No acute distress.


HEAD: Normocephalic, atraumatic.


EYES: Pupils equal, round, and reactive to light. Extraocular movements intact.


ENT: Oral mucosa moist, tongue midline. Oropharynx unremarkable. Airway patent. 

Nares patent, no nasal septal hematoma, TM's intact.


NECK: Full range of motion. Supple. Trachea midline.


LUNGS: Clear to auscultation bilaterally, no wheezes, rales, or rhonchi. No 

respiratory distress.


HEART: Regular rate and rhythm. No murmur


ABDOMEN: Soft, non-tender. Non-distended. Bowel sounds present in all 4 

quadrants.


GENITOURINARY: Deferred


EXTREMITIES: Moves all 4 extremities spontaneously.  Mild edema bilaterally, 

normal radial and dorsalis pedis pulses bilaterally. No cyanosis.


BACK: no cervical, thoracic, lumbar midline tenderness. No saddle anesthesia, 

normal distal neurovascular exam. 


NEUROLOGICAL: Alert, oriented.  Intermittently slightly slurred speech.  

Questionable mild left-sided facial droop [cranial nerves II through XII grossly

 intact]. 


PSYCH: Normal affect, normal mood.


SKIN: Warm, dry, normal turgor. No rashes or lesions noted.





Course





- Re-evaluation


Re-evalutation: 


EKG unremarkable, chest x-ray unremarkable, his anemia, this is reportedly 

chronic, chemistry generally unremarkable as well.  Urinalysis unremarkable.  

CAT scan of the head reviewed and normal.  Troponin is negative.  On evaluation 

patient does have some difficulty getting words out occasionally, occasionally 

seems to be slurring some words but this is very minimal.  They both state that 

this is more noticeable over the past weekend and deny it being present before 

that.  Subtle questionable droop to the left side of the mouth, no numbness, no 

forehead sparing, exam does not suggest Bell's palsy.





Discussed with patient and sister.  They are hoping for an MRI to rule out CVA 

as a cause of patient's symptoms.  I feel it is unlikely based on the 1 week of 

symptoms being present, however since this is possible and patient symptoms are 

reportedly new, MRI was ordered.  I discussed with Dr. Lawton. 





MRI is negative for acute findings, shows left maxillary sinusitis.  Patient 

does not appear to have a sinus infection on exam, she does have some congestion

and drainage, given Flonase for this instead.  Discussed different possibilities

including risperidone as a possible cause of patient's symptoms.  However 

patient and sister both state that off of the risperidone patient was extremely 

holloway and difficult and they both state that they will follow-up with her 

primary care to have this adjusted.  No adjustments were given at this time.  

Patient remains well-appearing and talkative on reevaluation, vital signs 

unremarkable, discussed follow-up and return precautions, they state under

standing and agreement, stable at time of discharge.





- Vital Signs


Vital signs: 


                                        











Temp Pulse Resp BP Pulse Ox


 


 98.7 F   80   20   120/64   99 


 


 02/24/19 15:37  02/24/19 19:30  02/24/19 21:01  02/24/19 21:01  02/24/19 21:01














- Laboratory


Result Diagrams: 


                                 02/24/19 17:27





                                 02/24/19 17:27


Laboratory results interpreted by me: 


                                        











  02/24/19 02/24/19





  17:27 17:27


 


WBC  3.1 L 


 


RBC  2.87 L 


 


Hgb  9.4 L 


 


Hct  27.8 L 


 


RDW  16.3 H 


 


Plt Count  103 L 


 


Glucose   199 H


 


AST   61 H














Discharge





- Discharge


Clinical Impression: 


 Slurred speech, Swelling of both lower extremities, Sinus congestion





Condition: Stable


Disposition: HOME, SELF-CARE


Additional Instructions: 


The MRI of your brain shows some sinus inflammation in the left maxillary sinus 

but the brain does not show a stroke or concerning finding.


Your remaining workup does not show any concerning findings, you do have some 

ongoing anemia, your liver function tests are not concerningly elevated.


The risperidone may be responsible for your symptoms.  Follow-up closely with 

primary care to consider adjusting medicine and alternatives.  The Flonase can 

help with the sinus congestion and drainage, you have been prescribed this.


Return to the emergency department for any concerning symptoms including severe 

headache, fever, or any other concerning or worsening symptoms.


Prescriptions: 


Fluticasone Propionate [Flonase Nasal Spray 50 Mcg/Spray 16 gm] 1 spray NASL Q12

#1 inhaler

## 2019-02-24 NOTE — RADIOLOGY REPORT (SQ)
EXAM DESCRIPTION: 



MR BRAIN WITHOUT IV CONTRAST



COMPLETED DATE/TME:  02/24/2019 19:33



CLINICAL HISTORY: 



68 years, Female, slurred speach, left facial droop



COMPARISON:

CT brain from today's date



TECHNIQUE:

279  Images stored on PACS.

 



LIMITATIONS:

None.



FINDINGS:



Sagittal midline anatomic structures demonstrate an unremarkable

appearance to the pituitary and suprasellar regions. Mucosal

thickening with air-fluid level of the left maxillary sinus.

Normal flow void in visualized intracranial vessels. The

visualized cranial nerve complex these are unremarkable. No intra

or extra-axial hemorrhage. Diffusion-weighted images are normal

without evidence for acute infarct. There is no mass or midline

shift. Mild diffuse atrophy. Scattered foci of increased FLAIR/T2

white matter signal in the periventricular and subcortical

regions consistent with sequelae of minor small vessel ischemic

change.





IMPRESSION:



Mild atrophy and minor small vessel ischemic change.



Left maxillary sinusitis

 



copyright 2011 [employer]- All Rights Reserved

## 2019-02-24 NOTE — RADIOLOGY REPORT (SQ)
EXAM DESCRIPTION:  CHEST SINGLE VIEW



COMPLETED DATE/TIME:  2/24/2019 5:39 pm



REASON FOR STUDY:  shortness of breath



COMPARISON:  2/17/2018



EXAM PARAMETERS:  NUMBER OF VIEWS: One view.

TECHNIQUE: Single frontal radiographic view of the chest acquired.

RADIATION DOSE: NA

LIMITATIONS: None.



FINDINGS:  LUNGS AND PLEURA: Lungs are hyperaerated.  Linear atelectasis versus scarring of the left 
lingula.  No focal consolidation, pleural effusion, or pneumothorax.

MEDIASTINUM AND HILAR STRUCTURES: No masses.  Contour normal.

HEART AND VASCULAR STRUCTURES: Heart normal in size.  Central pulmonary vasculature is accentuated du
e to low lung volumes.

BONES: No acute findings.

HARDWARE: None in the chest.

OTHER: No other significant finding.



IMPRESSION:  NO ACUTE RADIOGRAPHIC FINDING IN THE CHEST.



TECHNICAL DOCUMENTATION:  JOB ID:  [id number]

 2011 [employer]- All Rights Reserved



Reading location - IP/workstation name: [email or web address]